# Patient Record
Sex: FEMALE | Race: WHITE | Employment: OTHER | URBAN - METROPOLITAN AREA
[De-identification: names, ages, dates, MRNs, and addresses within clinical notes are randomized per-mention and may not be internally consistent; named-entity substitution may affect disease eponyms.]

---

## 2023-04-26 ENCOUNTER — EXTERNAL FACILITY (OUTPATIENT)
Dept: PULMONOLOGY | Facility: CLINIC | Age: 88
End: 2023-04-26

## 2023-04-26 DIAGNOSIS — J44.9 CHRONIC OBSTRUCTIVE PULMONARY DISEASE, UNSPECIFIED COPD TYPE (HCC): ICD-10-CM

## 2023-04-26 DIAGNOSIS — J96.01 ACUTE HYPOXEMIC RESPIRATORY FAILURE (HCC): Primary | ICD-10-CM

## 2023-04-26 DIAGNOSIS — Z72.0 TOBACCO USE: ICD-10-CM

## 2023-04-26 PROCEDURE — 99309 SBSQ NF CARE MODERATE MDM 30: CPT | Performed by: PHYSICIAN ASSISTANT

## 2023-04-26 NOTE — PROGRESS NOTES
Pulmonary Progress Note  [facility] External Facility - [address]    HPI: Pt is a 81 yo F who I am seeing for f/u. Recently hospitalized at [facility] for COPD exacerbation and she was discharged on 2 L supplemental oxygen. She does not use oxygen at home at baseline. She has been weaned off supplemental O2 as of today, however, notes dyspnea on exertion when off O2. No shortness of breath at rest. She has a nonproductive cough. No wheezing. ROS: Vision notable for visual impairment, wears glasses. Ears nose and throat notable for nasal congestion. Bowel notable for constipation. Bladder normal. Thyroid disease. No depression. No muscle or joint pain. Weight gain (25 pounds over several year period). PE:  /69, HR 74, RR 18, T 97.8, sat 98%  General: Awake, alert, NAD  HEENT: NC/AT, PEERL, MMM  Cardio: RRR, S1S2, no murmurs  Lungs: Thorax symmetrical with no labored breathing, markedly diminished BS bilaterally with subtle end-expiratory wheezes  Extremities: No clubbing or cyanosis, no LE edema  Skin: Warm, dry, no visible rashes or wounds  Neuro: A&Ox3, no focal deficits, tremor  Psych: Calm, cooperative, pleasant affect    Results:  -4/20: wbc 9.76, hgb 13.2, plt 258, cr 0.80. bun 18, na 134, k 4.0, co2 30  -4/17: wbc 8.60, hgb 13.8, plt 262, cr 0.80, bun 27, na 134, k 3.5, co2 30    A/P:  1. Acute hypoxemic respiratory failure - due to COPD. Improving and weaned off oxygen at rest.    Plan:  -O2 if needed to maintain sats >90%  -6-min walk test prior to discharge to determine need for home O2    2. COPD - suspect severe with lifelong tobacco use. Stable at present. Finishing prednisone taper. Plan:  -Prednisone and taper to off  -Continue ICS/LABA (Breo)  -Albuterol MDI prn  -DuoNebs  -Outpatient PFTs    3. Tobacco use - had been smoking until hospitalization. Motivated to remain abstinent.     Plan:  -Complete tobacco cessation    Kaitlynn Campo PA-C  Pulmonary Medicine